# Patient Record
Sex: MALE
[De-identification: names, ages, dates, MRNs, and addresses within clinical notes are randomized per-mention and may not be internally consistent; named-entity substitution may affect disease eponyms.]

---

## 2020-02-25 ENCOUNTER — HOSPITAL ENCOUNTER (OUTPATIENT)
Dept: HOSPITAL 61 - ENDOS | Age: 65
End: 2020-02-25
Attending: INTERNAL MEDICINE
Payer: COMMERCIAL

## 2020-02-25 VITALS — DIASTOLIC BLOOD PRESSURE: 79 MMHG | SYSTOLIC BLOOD PRESSURE: 105 MMHG

## 2020-02-25 DIAGNOSIS — Z72.89: ICD-10-CM

## 2020-02-25 DIAGNOSIS — Z12.11: Primary | ICD-10-CM

## 2020-02-25 DIAGNOSIS — F15.90: ICD-10-CM

## 2020-02-25 DIAGNOSIS — I10: ICD-10-CM

## 2020-02-25 DIAGNOSIS — F32.9: ICD-10-CM

## 2020-02-25 DIAGNOSIS — F17.210: ICD-10-CM

## 2020-02-25 DIAGNOSIS — D12.2: ICD-10-CM

## 2020-02-25 DIAGNOSIS — K64.0: ICD-10-CM

## 2020-02-25 PROCEDURE — 88305 TISSUE EXAM BY PATHOLOGIST: CPT

## 2020-02-25 PROCEDURE — 99152 MOD SED SAME PHYS/QHP 5/>YRS: CPT

## 2020-02-25 PROCEDURE — 45380 COLONOSCOPY AND BIOPSY: CPT

## 2020-02-27 NOTE — PATHOLOGY
[facility] Accession Number: [id number]

.                                                                01

Material submitted:                                        .

colon - ASCENDING COLON POLYPS X3. Modifiers: ascending

.                                                                01

Clinical history:                                          .

Screening

.                                                                02

**********************************************************************

Diagnosis:

Colon biopsies, ascending colon polyps x3:

- Tubular adenomas.

- Segments of colonic mucosa showing no significant pathologic

abnormalities.

.

(JPM:pit 02/27/2020)

[facility]  02/27/2020  0951 Local

**********************************************************************

.                                                                02

Comment:

Sections of the ascending colon biopsy reveal three segments of tubular

adenoma and approximately four segments of colonic mucosa showing no

significant pathologic abnormalities.  There is no high-grade dysplasia or

evidence of malignancy.

(JPM:pit 02/27/2020)

.                                                                02

Electronically signed:                                     .

Raz Pino MD, Pathologist

NPI- [id number]

.                                                                01

Gross description:                                         .

The specimen is received in formalin, labeled "Earnest Subramanian, ascending

colon polyps x3".  Received are seven segments of pale tan soft tissue

ranging in size from 0.2 to 0.5 cm in maximum dimensions.  The specimen is

submitted entirely in cassette A1.

([facility]; 2/26/2020)

QA/QA  02/26/2020  1606 Local

.                                                                02

Pathologist provided ICD-10:

D12.2

.                                                                02

CPT                                                        .

310907

Specimen Comment: A courtesy copy of this report has been sent to [phone], [phone]

Specimen Comment: [id number]

Specimen Comment: Report sent to  / DR BURNS

***Performed at:  01

   [facility]

   [address], [place], [place]  [id number]

   MD Bravo Mcgee MD Phone:  [phone]

***Performed at:  02

   [facility]

   [address], [place]  [id number]

   MD Raz Pino MD Phone:  [phone]